# Patient Record
Sex: FEMALE | Race: WHITE | Employment: UNEMPLOYED | URBAN - METROPOLITAN AREA
[De-identification: names, ages, dates, MRNs, and addresses within clinical notes are randomized per-mention and may not be internally consistent; named-entity substitution may affect disease eponyms.]

---

## 2020-08-12 ENCOUNTER — HOSPITAL ENCOUNTER (EMERGENCY)
Age: 50
Discharge: HOME | End: 2020-08-12
Payer: MEDICAID

## 2020-08-12 ENCOUNTER — APPOINTMENT (OUTPATIENT)
Dept: GENERAL RADIOLOGY | Age: 50
End: 2020-08-12
Payer: MEDICAID

## 2020-08-12 VITALS
RESPIRATION RATE: 18 BRPM | OXYGEN SATURATION: 99 % | WEIGHT: 110 LBS | BODY MASS INDEX: 20.77 KG/M2 | HEIGHT: 61 IN | TEMPERATURE: 98.4 F | HEART RATE: 98 BPM | DIASTOLIC BLOOD PRESSURE: 75 MMHG | SYSTOLIC BLOOD PRESSURE: 140 MMHG

## 2020-08-12 PROCEDURE — 99283 EMERGENCY DEPT VISIT LOW MDM: CPT

## 2020-08-12 PROCEDURE — 73610 X-RAY EXAM OF ANKLE: CPT

## 2020-08-12 PROCEDURE — 6370000000 HC RX 637 (ALT 250 FOR IP): Performed by: PERSONAL EMERGENCY RESPONSE ATTENDANT

## 2020-08-12 PROCEDURE — 73630 X-RAY EXAM OF FOOT: CPT

## 2020-08-12 RX ORDER — NAPROXEN 500 MG/1
500 TABLET ORAL ONCE
Status: COMPLETED | OUTPATIENT
Start: 2020-08-12 | End: 2020-08-12

## 2020-08-12 RX ORDER — NAPROXEN 500 MG/1
500 TABLET ORAL 2 TIMES DAILY WITH MEALS
Qty: 30 TABLET | Refills: 0 | Status: SHIPPED | OUTPATIENT
Start: 2020-08-12 | End: 2021-01-13

## 2020-08-12 RX ADMIN — NAPROXEN 500 MG: 500 TABLET ORAL at 21:03

## 2020-08-12 ASSESSMENT — ENCOUNTER SYMPTOMS
DIARRHEA: 0
NAUSEA: 0
COLOR CHANGE: 0
SORE THROAT: 0
RHINORRHEA: 0
VOMITING: 0
COUGH: 0
SHORTNESS OF BREATH: 0
ABDOMINAL PAIN: 0
BLOOD IN STOOL: 0

## 2020-08-12 ASSESSMENT — PAIN DESCRIPTION - LOCATION: LOCATION: ANKLE

## 2020-08-12 ASSESSMENT — PAIN DESCRIPTION - ORIENTATION: ORIENTATION: LEFT

## 2020-08-12 ASSESSMENT — PAIN DESCRIPTION - PAIN TYPE: TYPE: ACUTE PAIN

## 2020-08-12 ASSESSMENT — PAIN SCALES - GENERAL
PAINLEVEL_OUTOF10: 7
PAINLEVEL_OUTOF10: 7

## 2020-08-12 ASSESSMENT — PAIN DESCRIPTION - DESCRIPTORS: DESCRIPTORS: THROBBING;SHARP

## 2020-08-13 NOTE — ED PROVIDER NOTES
[address] [facility] ED  eMERGENCY dEPARTMENT eNCOUnter      Pt Name: Pierre Moore  MRN: [id number]  [place] 1970  Date of evaluation: 8/12/2020  Provider: CANDIE Romo      HISTORY OF PRESENT ILLNESS    Pierre Moore is a 44 y.o. female with PMHx of none presents to the emergency department with left foot pain. A couple hours ago patient fell into a pothole, twisting her left ankle/foot. She complains of pain over the dorsal aspect of her left foot. No ankle pain. She did not take anything at home for pain. She initially did not have any pain but had a gradual onset. Having to limp with ambulation. HPI    Nursing Notes were reviewed. REVIEW OF SYSTEMS       Review of Systems   Constitutional: Negative for appetite change, chills and fever. HENT: Negative for congestion, rhinorrhea and sore throat. Respiratory: Negative for cough and shortness of breath. Cardiovascular: Negative for chest pain. Gastrointestinal: Negative for abdominal pain, blood in stool, diarrhea, nausea and vomiting. Genitourinary: Negative for difficulty urinating. Musculoskeletal: Positive for arthralgias and gait problem. Negative for neck stiffness. Skin: Negative for color change and rash. Neurological: Negative for dizziness, syncope, weakness, light-headedness, numbness and headaches. All other systems reviewed and are negative. PAST MEDICAL HISTORY     Past Medical History:   Diagnosis Date    Hyperlipidemia     Hypertension          SURGICAL HISTORY       Past Surgical History:   Procedure Laterality Date    HYSTERECTOMY           CURRENT MEDICATIONS       Previous Medications    No medications on file       ALLERGIES     Morphine and related and Penicillins    FAMILY HISTORY     History reviewed. No pertinent family history.        SOCIAL HISTORY       Social History     Socioeconomic History    Marital status: Single     Spouse name: None    Number of children: None    Years of education: None    Highest education level: None   Occupational History    None   Social Needs    Financial resource strain: None    Food insecurity     Worry: None     Inability: None    Transportation needs     Medical: None     Non-medical: None   Tobacco Use    Smoking status: Heavy Tobacco Smoker     Packs/day: 2.00     Years: 25.00     Pack years: 50.00     Types: Cigarettes    Smokeless tobacco: Never Used   Substance and Sexual Activity    Alcohol use: Yes    Drug use: Never    Sexual activity: Not Currently   Lifestyle    Physical activity     Days per week: None     Minutes per session: None    Stress: None   Relationships    Social connections     Talks on phone: None     Gets together: None     Attends Yarsanism service: None     Active member of club or organization: None     Attends meetings of clubs or organizations: None     Relationship status: None    Intimate partner violence     Fear of current or ex partner: None     Emotionally abused: None     Physically abused: None     Forced sexual activity: None   Other Topics Concern    None   Social History Narrative    None         PHYSICAL EXAM         ED Triage Vitals [08/12/20 2032]   BP Temp Temp Source Pulse Resp SpO2 Height Weight   (!) 140/75 98.4 °F (36.9 °C) Oral 98 18 99 % 5' 1\" (1.549 m) 110 lb (49.9 kg)       Physical Exam  Constitutional:       Appearance: She is well-developed. HENT:      Head: Normocephalic and atraumatic. Eyes:      Conjunctiva/sclera: Conjunctivae normal.      Pupils: Pupils are equal, round, and reactive to light. Neck:      Musculoskeletal: Normal range of motion and neck supple. Trachea: No tracheal deviation. Cardiovascular:      Heart sounds: Normal heart sounds. Pulmonary:      Effort: Pulmonary effort is normal. No respiratory distress. Breath sounds: Normal breath sounds. No stridor. Abdominal:      General: Bowel sounds are normal. There is no distension. Palpations: Abdomen is soft. There is no mass. Tenderness: There is no abdominal tenderness. There is no guarding or rebound. Musculoskeletal: Normal range of motion. General: Tenderness present. Comments: Patient has minimal tenderness palpation throughout dorsal aspect of left foot over 1-4 metatarsals and tarsals. Full ankle range of motion without tenderness. MSP intact distally. No fifth metatarsal tenderness, no signs of trauma, no swelling. Skin:     General: Skin is warm and dry. Capillary Refill: Capillary refill takes less than 2 seconds. Findings: No rash. Neurological:      Mental Status: She is alert and oriented to person, place, and time. Deep Tendon Reflexes: Reflexes are normal and symmetric. Psychiatric:         Behavior: Behavior normal.         Thought Content: Thought content normal.         Judgment: Judgment normal.         DIAGNOSTIC RESULTS     EKG:All EKG's are interpreted by the Emergency Department Physician who either signs or Co-signs this chart in the absence of a cardiologist.        RADIOLOGY:   Non-plain film images such as CT, Ultrasound and MRI are read by theradiologist. Plain radiographic images are visualized and preliminarily interpreted by the emergency physician with the below findings:    Interpretation per theRadiologist below, if available at the time of this note:    XR ANKLE LEFT (MIN 3 VIEWS)    (Results Pending)   XR FOOT LEFT (MIN 3 VIEWS)    (Results Pending)           LABS:  Labs Reviewed - No data to display    All other labs were within normal range or not returned as of this dictation. EMERGENCY DEPARTMENT COURSE and DIFFERENTIAL DIAGNOSIS/MDM:   Vitals:    Vitals:    08/12/20 2032   BP: (!) 140/75   Pulse: 98   Resp: 18   Temp: 98.4 °F (36.9 °C)   TempSrc: Oral   SpO2: 99%   Weight: 110 lb (49.9 kg)   Height: 5' 1\" (1.549 m)         MDM    Ankle XR shows no acute fractures.   After assessment, patient is more tender over her foot. Foot x-ray was ordered. No fractures present. Patient was given ice while in the ER and naproxen. She will be sent home with naproxen and aware to ice the areas. She does see a family doctor next week and was also given orthopedic follow-up. She does not want crutches. She will be placed in postop shoe. Standard anticipatory guidance given to patient upon discharge. Have given them a specific time frame in which to follow-up and who to follow-up with. I have also advised them that they should return to the emergency department if they get worse, or not getting better or develop any new or concerning symptoms. Patient demonstrates understanding. CRITICAL CARE TIME   Total Critical Caretime was 0 minutes, excluding separately reportable procedures. There was a high probability of clinically significant/life threatening deterioration in the patient's condition which required my urgent intervention. Procedures    FINAL IMPRESSION      1. Sprain of left foot, initial encounter          DISPOSITION/PLAN   DISPOSITION Decision To Discharge 08/12/2020 09:23:52 PM      PATIENT REFERRED TO:  [facility]  [address] [address]  [address] [address]  [phone]  In 1 week  As needed      DISCHARGE MEDICATIONS:  New Prescriptions    NAPROXEN (NAPROSYN) 500 MG TABLET    Take 1 tablet by mouth 2 times daily (with meals)          (Please notethat portions of this note were completed with a voice recognition program.  Efforts were made to edit the dictations but occasionally words are mis-transcribed. )    CANDIE Ragland (electronically signed)  Emergency Physician Assistant         Henrry Sánchez [place]  08/12/20 9393

## 2020-08-13 NOTE — ED TRIAGE NOTES
Patient was walking in the yard and tripped in a hole. She didn't feel any popping or tearing but she cant walk on her left ankle. She is alert and orientated x 4. Pink, warm and dry. VSS. Will continue to monitor.

## 2021-01-13 ENCOUNTER — APPOINTMENT (OUTPATIENT)
Dept: CT IMAGING | Age: 51
End: 2021-01-13
Payer: MEDICAID

## 2021-01-13 ENCOUNTER — HOSPITAL ENCOUNTER (EMERGENCY)
Age: 51
Discharge: HOME | End: 2021-01-13
Payer: MEDICAID

## 2021-01-13 VITALS
HEART RATE: 74 BPM | OXYGEN SATURATION: 98 % | HEIGHT: 61 IN | WEIGHT: 109 LBS | TEMPERATURE: 97.9 F | BODY MASS INDEX: 20.58 KG/M2 | SYSTOLIC BLOOD PRESSURE: 105 MMHG | RESPIRATION RATE: 16 BRPM | DIASTOLIC BLOOD PRESSURE: 66 MMHG

## 2021-01-13 DIAGNOSIS — N10 ACUTE PYELONEPHRITIS: Primary | ICD-10-CM

## 2021-01-13 LAB
ALBUMIN SERPL-MCNC: 4 G/DL (ref 3.5–4.6)
ALP BLD-CCNC: 78 U/L (ref 40–130)
ALT SERPL-CCNC: 7 U/L (ref 0–33)
ANION GAP SERPL CALCULATED.3IONS-SCNC: 15 MEQ/L (ref 9–15)
AST SERPL-CCNC: 11 U/L (ref 0–35)
BACTERIA: ABNORMAL /HPF
BASOPHILS ABSOLUTE: 0 K/UL (ref 0–0.2)
BASOPHILS RELATIVE PERCENT: 0.3 %
BILIRUB SERPL-MCNC: 0.5 MG/DL (ref 0.2–0.7)
BILIRUBIN URINE: NEGATIVE
BLOOD, URINE: ABNORMAL
BUN BLDV-MCNC: 14 MG/DL (ref 6–20)
CALCIUM SERPL-MCNC: 9.6 MG/DL (ref 8.5–9.9)
CHLORIDE BLD-SCNC: 96 MEQ/L (ref 95–107)
CLARITY: ABNORMAL
CO2: 23 MEQ/L (ref 20–31)
COLOR: ABNORMAL
CREAT SERPL-MCNC: 0.87 MG/DL (ref 0.5–0.9)
EOSINOPHILS ABSOLUTE: 0 K/UL (ref 0–0.7)
EOSINOPHILS RELATIVE PERCENT: 0 %
EPITHELIAL CELLS, UA: ABNORMAL /HPF (ref 0–5)
GFR AFRICAN AMERICAN: >60
GFR NON-AFRICAN AMERICAN: >60
GLOBULIN: 3.9 G/DL (ref 2.3–3.5)
GLUCOSE BLD-MCNC: 121 MG/DL (ref 70–99)
GLUCOSE URINE: NEGATIVE MG/DL
HCT VFR BLD CALC: 38 % (ref 37–47)
HEMOGLOBIN: 12.7 G/DL (ref 12–16)
HYALINE CASTS: ABNORMAL /HPF (ref 0–5)
KETONES, URINE: ABNORMAL MG/DL
LEUKOCYTE ESTERASE, URINE: ABNORMAL
LYMPHOCYTES ABSOLUTE: 0.6 K/UL (ref 1–4.8)
LYMPHOCYTES RELATIVE PERCENT: 4.4 %
MAGNESIUM: 1.5 MG/DL (ref 1.7–2.4)
MCH RBC QN AUTO: 31 PG (ref 27–31.3)
MCHC RBC AUTO-ENTMCNC: 33.4 % (ref 33–37)
MCV RBC AUTO: 92.8 FL (ref 82–100)
MONOCYTES ABSOLUTE: 1.1 K/UL (ref 0.2–0.8)
MONOCYTES RELATIVE PERCENT: 8.4 %
NEUTROPHILS ABSOLUTE: 11.2 K/UL (ref 1.4–6.5)
NEUTROPHILS RELATIVE PERCENT: 86.9 %
NITRITE, URINE: POSITIVE
PDW BLD-RTO: 16.4 % (ref 11.5–14.5)
PH UA: 5 (ref 5–9)
PLATELET # BLD: 140 K/UL (ref 130–400)
POTASSIUM SERPL-SCNC: 4.1 MEQ/L (ref 3.4–4.9)
PROTEIN UA: >=300 MG/DL
RBC # BLD: 4.09 M/UL (ref 4.2–5.4)
RBC UA: ABNORMAL /HPF (ref 0–5)
SODIUM BLD-SCNC: 134 MEQ/L (ref 135–144)
SPECIFIC GRAVITY UA: 1.02 (ref 1–1.03)
TOTAL PROTEIN: 7.9 G/DL (ref 6.3–8)
URINE REFLEX TO CULTURE: YES
UROBILINOGEN, URINE: 0.2 E.U./DL
WBC # BLD: 12.9 K/UL (ref 4.8–10.8)
WBC UA: >100 /HPF (ref 0–5)

## 2021-01-13 PROCEDURE — 74150 CT ABDOMEN W/O CONTRAST: CPT

## 2021-01-13 PROCEDURE — 96374 THER/PROPH/DIAG INJ IV PUSH: CPT

## 2021-01-13 PROCEDURE — 36415 COLL VENOUS BLD VENIPUNCTURE: CPT

## 2021-01-13 PROCEDURE — 99283 EMERGENCY DEPT VISIT LOW MDM: CPT

## 2021-01-13 PROCEDURE — 87077 CULTURE AEROBIC IDENTIFY: CPT

## 2021-01-13 PROCEDURE — 6360000002 HC RX W HCPCS: Performed by: PHYSICIAN ASSISTANT

## 2021-01-13 PROCEDURE — 85025 COMPLETE CBC W/AUTO DIFF WBC: CPT

## 2021-01-13 PROCEDURE — 6370000000 HC RX 637 (ALT 250 FOR IP): Performed by: PHYSICIAN ASSISTANT

## 2021-01-13 PROCEDURE — 87186 SC STD MICRODIL/AGAR DIL: CPT

## 2021-01-13 PROCEDURE — 83735 ASSAY OF MAGNESIUM: CPT

## 2021-01-13 PROCEDURE — 96375 TX/PRO/DX INJ NEW DRUG ADDON: CPT

## 2021-01-13 PROCEDURE — 2580000003 HC RX 258: Performed by: PHYSICIAN ASSISTANT

## 2021-01-13 PROCEDURE — 81001 URINALYSIS AUTO W/SCOPE: CPT

## 2021-01-13 PROCEDURE — 87086 URINE CULTURE/COLONY COUNT: CPT

## 2021-01-13 PROCEDURE — 80053 COMPREHEN METABOLIC PANEL: CPT

## 2021-01-13 RX ORDER — ONDANSETRON 4 MG/1
4 TABLET, ORALLY DISINTEGRATING ORAL EVERY 8 HOURS PRN
Qty: 20 TABLET | Refills: 0 | Status: SHIPPED | OUTPATIENT
Start: 2021-01-13

## 2021-01-13 RX ORDER — NAPROXEN 500 MG/1
500 TABLET ORAL 2 TIMES DAILY
Qty: 60 TABLET | Refills: 0 | Status: SHIPPED | OUTPATIENT
Start: 2021-01-13

## 2021-01-13 RX ORDER — PHENAZOPYRIDINE HYDROCHLORIDE 100 MG/1
200 TABLET, FILM COATED ORAL ONCE
Status: COMPLETED | OUTPATIENT
Start: 2021-01-13 | End: 2021-01-13

## 2021-01-13 RX ORDER — FENTANYL CITRATE 50 UG/ML
25 INJECTION, SOLUTION INTRAMUSCULAR; INTRAVENOUS ONCE
Status: COMPLETED | OUTPATIENT
Start: 2021-01-13 | End: 2021-01-13

## 2021-01-13 RX ORDER — TRAMADOL HYDROCHLORIDE 50 MG/1
50 TABLET ORAL EVERY 6 HOURS PRN
Qty: 12 TABLET | Refills: 0 | Status: SHIPPED | OUTPATIENT
Start: 2021-01-13 | End: 2021-01-16

## 2021-01-13 RX ORDER — ONDANSETRON 2 MG/ML
4 INJECTION INTRAMUSCULAR; INTRAVENOUS ONCE
Status: COMPLETED | OUTPATIENT
Start: 2021-01-13 | End: 2021-01-13

## 2021-01-13 RX ORDER — CEPHALEXIN 500 MG/1
500 CAPSULE ORAL 4 TIMES DAILY
Qty: 40 CAPSULE | Refills: 0 | Status: SHIPPED | OUTPATIENT
Start: 2021-01-13 | End: 2021-11-01

## 2021-01-13 RX ORDER — 0.9 % SODIUM CHLORIDE 0.9 %
1000 INTRAVENOUS SOLUTION INTRAVENOUS ONCE
Status: COMPLETED | OUTPATIENT
Start: 2021-01-13 | End: 2021-01-13

## 2021-01-13 RX ORDER — NAPROXEN 500 MG/1
500 TABLET ORAL ONCE
Status: COMPLETED | OUTPATIENT
Start: 2021-01-13 | End: 2021-01-13

## 2021-01-13 RX ADMIN — FENTANYL CITRATE 25 MCG: 50 INJECTION, SOLUTION INTRAMUSCULAR; INTRAVENOUS at 17:14

## 2021-01-13 RX ADMIN — CEFTRIAXONE SODIUM 1 G: 1 INJECTION, POWDER, FOR SOLUTION INTRAMUSCULAR; INTRAVENOUS at 18:12

## 2021-01-13 RX ADMIN — PHENAZOPYRIDINE HYDROCHLORIDE 200 MG: 100 TABLET ORAL at 17:14

## 2021-01-13 RX ADMIN — SODIUM CHLORIDE 1000 ML: 9 INJECTION, SOLUTION INTRAVENOUS at 17:14

## 2021-01-13 RX ADMIN — NAPROXEN 500 MG: 500 TABLET ORAL at 18:12

## 2021-01-13 RX ADMIN — ONDANSETRON 4 MG: 2 INJECTION INTRAMUSCULAR; INTRAVENOUS at 17:14

## 2021-01-13 ASSESSMENT — ENCOUNTER SYMPTOMS
TROUBLE SWALLOWING: 0
SHORTNESS OF BREATH: 0
NAUSEA: 1
DIARRHEA: 0
ABDOMINAL PAIN: 0
EYE PAIN: 0
APNEA: 0
ALLERGIC/IMMUNOLOGIC NEGATIVE: 1
COLOR CHANGE: 0

## 2021-01-13 ASSESSMENT — PAIN SCALES - GENERAL
PAINLEVEL_OUTOF10: 9
PAINLEVEL_OUTOF10: 6
PAINLEVEL_OUTOF10: 7
PAINLEVEL_OUTOF10: 6
PAINLEVEL_OUTOF10: 9

## 2021-01-13 ASSESSMENT — PAIN DESCRIPTION - ORIENTATION: ORIENTATION: RIGHT

## 2021-01-13 NOTE — ED NOTES
Patient resting in bed with no s/s of distress. Rating pain 7/10; requesting naproxen  Matt SCHREIBER aware.         Porfirio Buckley RN  01/13/21 2080

## 2021-01-13 NOTE — ED PROVIDER NOTES
[address] [facility] ED  eMERGENCYdEPARTMENT eNCOUnter      Pt Name: Ray Epstein  MRN: [id number]  [place] 1970  Date of evaluation: 1/13/2021  Provider:Marc Hooper PA-C    CHIEF COMPLAINT       Chief Complaint   Patient presents with    Urinary Tract Infection     pt c/o burningf with urination and frequent urination  states that she only has one kidney and it hurts in her kidney area          HISTORY OF PRESENT ILLNESS  (Location/Symptom, Timing/Onset, Context/Setting, Quality, Duration, Modifying Factors, Severity.)   Ray Epstein is a 44 y.o. female who presents to the emergency department with complaints of urinary urgency, frequency, dysuria, abdominal pain and flank pain with associated nausea. Patient states that the symptoms been ongoing for the past 10 days and have aggressively been worsening in severity. Patient states nausea but denies any vomiting. Patient complains of some chills but has not taken her temperature. Patient denies any diarrhea or change in bowel habits. Patient does state her urine has been darker in color and believes there may be some blood in it. Patient has been trying drinking over-the-counter cranberry juice only for her symptoms without improvement    HPI    Nursing Notes were reviewed and I agree. REVIEW OF SYSTEMS    (2-9 systems for level 4, 10 or more for level 5)     Review of Systems   Constitutional: Positive for chills. Negative for diaphoresis and fever. HENT: Negative for hearing loss and trouble swallowing. Eyes: Negative for pain. Respiratory: Negative for apnea and shortness of breath. Cardiovascular: Negative for chest pain. Gastrointestinal: Positive for nausea. Negative for abdominal pain and diarrhea. Endocrine: Negative. Genitourinary: Positive for dysuria, flank pain, frequency and urgency. Negative for hematuria. Musculoskeletal: Negative for neck pain and neck stiffness. Skin: Negative for color change. Allergic/Immunologic: Negative. Neurological: Negative for dizziness and numbness. Hematological: Negative. Psychiatric/Behavioral: Negative. All other systems reviewed and are negative. Except as noted above the remainder of the review of systems was reviewed and negative. PAST MEDICAL HISTORY     Past Medical History:   Diagnosis Date    Hyperlipidemia     Hypertension          SURGICAL HISTORY       Past Surgical History:   Procedure Laterality Date    HYSTERECTOMY           CURRENT MEDICATIONS       Previous Medications    No medications on file       ALLERGIES     Morphine and related and Penicillins    FAMILY HISTORY     No family history on file. SOCIAL HISTORY       Social History     Socioeconomic History    Marital status: Single     Spouse name: Not on file    Number of children: Not on file    Years of education: Not on file    Highest education level: Not on file   Occupational History    Not on file   Social Needs    Financial resource strain: Not on file    Food insecurity     Worry: Not on file     Inability: Not on file    Transportation needs     Medical: Not on file     Non-medical: Not on file   Tobacco Use    Smoking status: Heavy Tobacco Smoker     Packs/day: 2.00     Years: 25.00     Pack years: 50.00     Types: Cigarettes    Smokeless tobacco: Never Used   Substance and Sexual Activity    Alcohol use:  Yes    Drug use: Never    Sexual activity: Not Currently   Lifestyle    Physical activity     Days per week: Not on file     Minutes per session: Not on file    Stress: Not on file   Relationships    Social connections     Talks on phone: Not on file     Gets together: Not on file     Attends Zoroastrianism service: Not on file     Active member of club or organization: Not on file     Attends meetings of clubs or organizations: Not on file     Relationship status: Not on file    Intimate partner violence Fear of current or ex partner: Not on file     Emotionally abused: Not on file     Physically abused: Not on file     Forced sexual activity: Not on file   Other Topics Concern    Not on file   Social History Narrative    Not on file       SCREENINGS           PHYSICAL EXAM    (up to 7 forlevel 4, 8 or more for level 5)     ED Triage Vitals [01/13/21 1622]   BP Temp Temp src Pulse Resp SpO2 Height Weight   130/86 97.9 °F (36.6 °C) -- 75 16 99 % 5' 1\" (1.549 m) 109 lb (49.4 kg)       Physical Exam  Vitals signs and nursing note reviewed. Constitutional:       General: She is not in acute distress. Appearance: She is well-developed. She is not diaphoretic. HENT:      Head: Normocephalic and atraumatic. Mouth/Throat:      Pharynx: No oropharyngeal exudate. Eyes:      General: No scleral icterus. Conjunctiva/sclera: Conjunctivae normal.      Pupils: Pupils are equal, round, and reactive to light. Neck:      Musculoskeletal: Normal range of motion and neck supple. Trachea: No tracheal deviation. Cardiovascular:      Rate and Rhythm: Normal rate. Heart sounds: Normal heart sounds. Pulmonary:      Effort: Pulmonary effort is normal. No respiratory distress. Breath sounds: Normal breath sounds. Abdominal:      General: Bowel sounds are normal. There is no distension. Palpations: Abdomen is soft. Tenderness: There is abdominal tenderness in the right upper quadrant and right lower quadrant. There is right CVA tenderness. There is no left CVA tenderness. Musculoskeletal: Normal range of motion. Skin:     General: Skin is warm and dry. Findings: No erythema or rash. Neurological:      Mental Status: She is alert and oriented to person, place, and time. Cranial Nerves: No cranial nerve deficit. Motor: No abnormal muscle tone. Psychiatric:         Behavior: Behavior normal.         Thought Content:  Thought content normal. Judgment: Judgment normal.           DIAGNOSTIC RESULTS     RADIOLOGY:   Non-plain film images such as CT, Ultrasound and MRI are read by the radiologist. Edinayden Reynaga radiographic images are visualized and preliminarilyinterpreted by Olga Diaz PA-C with the below findings:    Pyelonephritis    Interpretation per the Radiologist below, if available at the time of this note:    CT KIDNEY WO CONTRAST    (Results Pending)       LABS:  Labs Reviewed   COMPREHENSIVE METABOLIC PANEL - Abnormal; Notable for the following components:       Result Value    Sodium 134 (*)     Glucose 121 (*)     Globulin 3.9 (*)     All other components within normal limits   CBC WITH AUTO DIFFERENTIAL - Abnormal; Notable for the following components:    WBC 12.9 (*)     RBC 4.09 (*)     RDW 16.4 (*)     Neutrophils Absolute 11.2 (*)     Lymphocytes Absolute 0.6 (*)     Monocytes Absolute 1.1 (*)     All other components within normal limits   URINE RT REFLEX TO CULTURE - Abnormal; Notable for the following components:    Color, UA DARK YELLOW (*)     Clarity, UA TURBID (*)     Ketones, Urine TRACE (*)     Blood, Urine MODERATE (*)     Protein, UA >=300 (*)     Nitrite, Urine POSITIVE (*)     Leukocyte Esterase, Urine MODERATE (*)     All other components within normal limits   MAGNESIUM - Abnormal; Notable for the following components:    Magnesium 1.5 (*)     All other components within normal limits   MICROSCOPIC URINALYSIS       All other labs were within normal range or not returnedas of this dictation.     EMERGENCYDEPARTMENT COURSE and DIFFERENTIAL DIAGNOSIS/MDM:   Vitals:    Vitals:    01/13/21 1622   BP: 130/86   Pulse: 75   Resp: 16   Temp: 97.9 °F (36.6 °C)   SpO2: 99%   Weight: 109 lb (49.4 kg)   Height: 5' 1\" (1.549 m)       REASSESSMENT Presents emergency department with a 10-day history of UTI symptoms as well as right flank pain. Patient has had chills but no documented fever. Laboratory testing does reveal a slightly elevated white count at 12.9. Patient's kidney function is unremarkable. CT scan does reveal possible pyelonephritis but no mobile stones. Patient was given IV antibiotic in the emergency department and will be discharged home with supportive therapy including oral antibiotics, antiemetics, pain control and close PCP follow-up. Patient was advised to return to the emergency department for worsening pain, vomiting or high fevers    MDM    PROCEDURES:    Procedures      FINAL IMPRESSION      1. Acute pyelonephritis          DISPOSITION/PLAN   DISPOSITION Discharge - Pending Orders Complete 01/13/2021 06:18:49 PM      PATIENT REFERRED TO:  Nelsy Marti MD  [address]  [phone]    Call in 1 day        DISCHARGE MEDICATIONS:  New Prescriptions    CEPHALEXIN (KEFLEX) 500 MG CAPSULE    Take 1 capsule by mouth 4 times daily    NAPROXEN (NAPROSYN) 500 MG TABLET    Take 1 tablet by mouth 2 times daily    ONDANSETRON (ZOFRAN ODT) 4 MG DISINTEGRATING TABLET    Take 1 tablet by mouth every 8 hours as needed for Nausea    TRAMADOL (ULTRAM) 50 MG TABLET    Take 1 tablet by mouth every 6 hours as needed for Pain for up to 3 days. Intended supply: 3 days.  Take lowest dose possible to manage pain       (Please note that portions of this note were completed with a voice recognition program.  Efforts were made to edit the dictations but occasionally words are mis-transcribed.)    CHELLE Hunt PA-C  01/13/21 4938

## 2021-01-13 NOTE — ED TRIAGE NOTES
Patient came to the ED for urinary frequency, dysuria [id number]. Patient states urine is dark in color and malodorous.

## 2021-01-16 LAB
ORGANISM: ABNORMAL
URINE CULTURE, ROUTINE: ABNORMAL

## 2021-01-19 ENCOUNTER — APPOINTMENT (OUTPATIENT)
Dept: CT IMAGING | Age: 51
End: 2021-01-19
Payer: MEDICAID

## 2021-01-19 ENCOUNTER — HOSPITAL ENCOUNTER (EMERGENCY)
Age: 51
Discharge: HOME | End: 2021-01-19
Payer: MEDICAID

## 2021-01-19 VITALS
RESPIRATION RATE: 20 BRPM | DIASTOLIC BLOOD PRESSURE: 72 MMHG | SYSTOLIC BLOOD PRESSURE: 126 MMHG | WEIGHT: 109 LBS | TEMPERATURE: 98.4 F | HEART RATE: 76 BPM | BODY MASS INDEX: 20.58 KG/M2 | HEIGHT: 61 IN | OXYGEN SATURATION: 97 %

## 2021-01-19 DIAGNOSIS — R11.0 NAUSEA: ICD-10-CM

## 2021-01-19 DIAGNOSIS — R10.9 ABDOMINAL PAIN, UNSPECIFIED ABDOMINAL LOCATION: Primary | ICD-10-CM

## 2021-01-19 LAB
ALBUMIN SERPL-MCNC: 4 G/DL (ref 3.5–4.6)
ALP BLD-CCNC: 133 U/L (ref 40–130)
ALT SERPL-CCNC: 13 U/L (ref 0–33)
ANION GAP SERPL CALCULATED.3IONS-SCNC: 13 MEQ/L (ref 9–15)
AST SERPL-CCNC: 16 U/L (ref 0–35)
ATYPICAL LYMPHOCYTE RELATIVE PERCENT: 2 %
BACTERIA: NEGATIVE /HPF
BASOPHILS ABSOLUTE: 0 K/UL (ref 0–0.2)
BASOPHILS RELATIVE PERCENT: 0.9 %
BILIRUB SERPL-MCNC: <0.2 MG/DL (ref 0.2–0.7)
BILIRUBIN URINE: NEGATIVE
BLOOD, URINE: NEGATIVE
BUN BLDV-MCNC: 10 MG/DL (ref 6–20)
CALCIUM SERPL-MCNC: 10 MG/DL (ref 8.5–9.9)
CHLORIDE BLD-SCNC: 106 MEQ/L (ref 95–107)
CLARITY: CLEAR
CO2: 24 MEQ/L (ref 20–31)
COLOR: ABNORMAL
CREAT SERPL-MCNC: 0.75 MG/DL (ref 0.5–0.9)
EOSINOPHILS ABSOLUTE: 0.2 K/UL (ref 0–0.7)
EOSINOPHILS RELATIVE PERCENT: 2 %
EPITHELIAL CELLS, UA: NORMAL /HPF (ref 0–5)
GFR AFRICAN AMERICAN: >60
GFR NON-AFRICAN AMERICAN: >60
GLOBULIN: 4.2 G/DL (ref 2.3–3.5)
GLUCOSE BLD-MCNC: 90 MG/DL (ref 70–99)
GLUCOSE URINE: NEGATIVE MG/DL
HCT VFR BLD CALC: 35.7 % (ref 37–47)
HEMOGLOBIN: 11.9 G/DL (ref 12–16)
HYALINE CASTS: NORMAL /HPF (ref 0–5)
KETONES, URINE: NEGATIVE MG/DL
LACTIC ACID: 1 MMOL/L (ref 0.5–2.2)
LEUKOCYTE ESTERASE, URINE: NEGATIVE
LIPASE: 61 U/L (ref 12–95)
LYMPHOCYTES ABSOLUTE: 1.5 K/UL (ref 1–4.8)
LYMPHOCYTES RELATIVE PERCENT: 16 %
MAGNESIUM: 1.8 MG/DL (ref 1.7–2.4)
MCH RBC QN AUTO: 31.4 PG (ref 27–31.3)
MCHC RBC AUTO-ENTMCNC: 33.2 % (ref 33–37)
MCV RBC AUTO: 94.5 FL (ref 82–100)
MONOCYTES ABSOLUTE: 0.3 K/UL (ref 0.2–0.8)
MONOCYTES RELATIVE PERCENT: 3.9 %
NEUTROPHILS ABSOLUTE: 6.2 K/UL (ref 1.4–6.5)
NEUTROPHILS RELATIVE PERCENT: 73 %
NITRITE, URINE: POSITIVE
PDW BLD-RTO: 16.5 % (ref 11.5–14.5)
PH UA: 5.5 (ref 5–9)
PLATELET # BLD: 327 K/UL (ref 130–400)
PLATELET SLIDE REVIEW: NORMAL
POC CREATININE WHOLE BLOOD: 0.9
POTASSIUM SERPL-SCNC: 4.3 MEQ/L (ref 3.4–4.9)
PROCALCITONIN: 0.15 NG/ML (ref 0–0.15)
PROMYELOCYTES PERCENT: 3 %
PROTEIN UA: NEGATIVE MG/DL
RBC # BLD: 3.78 M/UL (ref 4.2–5.4)
RBC # BLD: NORMAL 10*6/UL
RBC UA: NORMAL /HPF (ref 0–5)
SMUDGE CELLS: 1.9
SODIUM BLD-SCNC: 143 MEQ/L (ref 135–144)
SPECIFIC GRAVITY UA: 1 (ref 1–1.03)
TOTAL PROTEIN: 8.2 G/DL (ref 6.3–8)
URINE REFLEX TO CULTURE: ABNORMAL
UROBILINOGEN, URINE: 1 E.U./DL
WBC # BLD: 8.1 K/UL (ref 4.8–10.8)
WBC UA: NORMAL /HPF (ref 0–5)

## 2021-01-19 PROCEDURE — 83605 ASSAY OF LACTIC ACID: CPT

## 2021-01-19 PROCEDURE — 99283 EMERGENCY DEPT VISIT LOW MDM: CPT

## 2021-01-19 PROCEDURE — 74177 CT ABD & PELVIS W/CONTRAST: CPT

## 2021-01-19 PROCEDURE — 87040 BLOOD CULTURE FOR BACTERIA: CPT

## 2021-01-19 PROCEDURE — 83735 ASSAY OF MAGNESIUM: CPT

## 2021-01-19 PROCEDURE — 80053 COMPREHEN METABOLIC PANEL: CPT

## 2021-01-19 PROCEDURE — 81001 URINALYSIS AUTO W/SCOPE: CPT

## 2021-01-19 PROCEDURE — 6360000004 HC RX CONTRAST MEDICATION: Performed by: STUDENT IN AN ORGANIZED HEALTH CARE EDUCATION/TRAINING PROGRAM

## 2021-01-19 PROCEDURE — 83690 ASSAY OF LIPASE: CPT

## 2021-01-19 PROCEDURE — 36415 COLL VENOUS BLD VENIPUNCTURE: CPT

## 2021-01-19 PROCEDURE — 99284 EMERGENCY DEPT VISIT MOD MDM: CPT

## 2021-01-19 PROCEDURE — 84145 PROCALCITONIN (PCT): CPT

## 2021-01-19 PROCEDURE — 2580000003 HC RX 258: Performed by: STUDENT IN AN ORGANIZED HEALTH CARE EDUCATION/TRAINING PROGRAM

## 2021-01-19 PROCEDURE — 85025 COMPLETE CBC W/AUTO DIFF WBC: CPT

## 2021-01-19 RX ORDER — ACETAMINOPHEN 500 MG
1000 TABLET ORAL EVERY 6 HOURS PRN
Qty: 80 TABLET | Refills: 0 | Status: SHIPPED | OUTPATIENT
Start: 2021-01-19 | End: 2021-01-29

## 2021-01-19 RX ORDER — 0.9 % SODIUM CHLORIDE 0.9 %
1000 INTRAVENOUS SOLUTION INTRAVENOUS ONCE
Status: COMPLETED | OUTPATIENT
Start: 2021-01-19 | End: 2021-01-19

## 2021-01-19 RX ADMIN — IOPAMIDOL 100 ML: 612 INJECTION, SOLUTION INTRAVENOUS at 20:16

## 2021-01-19 RX ADMIN — SODIUM CHLORIDE 1000 ML: 9 INJECTION, SOLUTION INTRAVENOUS at 19:40

## 2021-01-19 ASSESSMENT — PAIN SCALES - GENERAL: PAINLEVEL_OUTOF10: 4

## 2021-01-19 ASSESSMENT — PAIN DESCRIPTION - DESCRIPTORS: DESCRIPTORS: PRESSURE

## 2021-01-19 ASSESSMENT — PAIN DESCRIPTION - PAIN TYPE: TYPE: ACUTE PAIN

## 2021-01-19 NOTE — ED TRIAGE NOTES
Pt arrived with complaint of urinary issues. Pt reports the pain is suprapubic are. Pt describes the pain as pressure. Pt reports the pain radiates to lower back. Pt reports the pain is intermittent Pt reports the pain started 3-4 days ago. Pt took Azo. Pt reports last void was today. Pt rates the pain 6/10. On a pain scale 1-10, 10 being the highest. Pt reports moving increases the pain. Pt **denies/complaints of dysuria, frequency, urgency. Pt denies shortness of breath, weakness, fatigue, fever, cold sweats, dizziness, vomiting or nausea. Pt reports a history of kidney removal. Pt is A & O x 4.

## 2021-01-20 NOTE — ED NOTES
Pt awake and alert. No s/s of distress noted at this time. Pt denies needs at this time. Will continue to monitor.         Shashi Uribe RN  01/19/21 1943

## 2021-01-20 NOTE — ED NOTES
Pt awake and alert. No s/s of distress noted at this time. Pt denies needs at this time. Will continue to monitor.         Clayton Gaines RN  01/19/21 9194

## 2021-01-20 NOTE — ED PROVIDER NOTES
[facility] ED  EMERGENCY DEPARTMENT ENCOUNTER      Pt Name: Afshin Stanley  MRN: [id number]  [place] 1970  Date of evaluation: 1/19/2021  Provider: Lizandro Lester PA-C    CHIEF COMPLAINT       Chief Complaint   Patient presents with    Urinary Tract Infection     was dx last wed and not getting any better          HISTORY OF PRESENT ILLNESS   (Location/Symptom, Timing/Onset, Context/Setting, Quality, Duration, Modifying Factors, Severity)  Note limiting factors. Afshin Stanley is a 44 y.o. female who per chart review has pmhx of L heminephrectomy, HTN, hyperlipidemia presents to the emergency department with gradual onset, intermittent, moderate suprapubic pain described as pressure. She states she also has some right sided low back pain that radiates to the RLQ. Pt states she was here on 1/13 and dx with pyelo. She was given Keflex Tramadol and zofran but states the pharmacy changed the antibiotic because she had an allergy to PCN. I called her pharmacy and they state they changed her to [address] Pt has been compliant with the antibiotic and has a few doses left, last will be Saturday, 1/23. She is also taking Azo. Pain is worse with movement. She states urinary sx of frequency, urgency, and dysuria are improved but the suprapubic pain is not. She had a virtual visit with her pcp yesterday. Pt does not have a nephrologist, has not seen one for many years after moving to [place]. She denies fever, chills, nausea, vomiting, diarrhea, abd distention, blood in the stool or urine, cp, sob, bowel or bladder incontinence, saddle anesthesia, abd distention, difficulty passing gas. HPI    Nursing Notes were reviewed. REVIEW OF SYSTEMS    (2-9 systems for level 4, 10 or more for level 5)     Review of Systems   Constitutional: Negative for chills and fever. HENT: Negative for congestion. Eyes: Negative for photophobia. Respiratory: Negative for cough, shortness of breath and wheezing. Cardiovascular: Negative for chest pain and palpitations. Gastrointestinal: Positive for abdominal pain. Negative for abdominal distention, blood in stool, constipation, diarrhea, nausea and vomiting. Genitourinary: Negative for decreased urine volume, dysuria, flank pain, frequency, genital sores, hematuria, pelvic pain and urgency. Musculoskeletal: Positive for back pain. Negative for myalgias. Allergic/Immunologic: Negative for immunocompromised state. Neurological: Negative for dizziness, weakness and headaches. All other systems reviewed and are negative. Except as noted above the remainder of the review of systems was reviewed and negative. PAST MEDICAL HISTORY     Past Medical History:   Diagnosis Date    Hyperlipidemia     Hypertension          SURGICAL HISTORY       Past Surgical History:   Procedure Laterality Date    HYSTERECTOMY           CURRENT MEDICATIONS       Discharge Medication List as of 1/19/2021  9:56 PM      CONTINUE these medications which have NOT CHANGED    Details   cephALEXin (KEFLEX) 500 MG capsule Take 1 capsule by mouth 4 times daily, Disp-40 capsule, R-0Normal      ondansetron (ZOFRAN ODT) 4 MG disintegrating tablet Take 1 tablet by mouth every 8 hours as needed for Nausea, Disp-20 tablet, R-0Normal      naproxen (NAPROSYN) 500 MG tablet Take 1 tablet by mouth 2 times daily, Disp-60 tablet, R-0Normal             ALLERGIES     Morphine and related and Penicillins    FAMILY HISTORY     History reviewed. No pertinent family history.        SOCIAL HISTORY       Social History     Socioeconomic History    Marital status: Single     Spouse name: None    Number of children: None    Years of education: None    Highest education level: None   Occupational History    None   Social Needs    Financial resource strain: None    Food insecurity     Worry: None     Inability: None  Transportation needs     Medical: None     Non-medical: None   Tobacco Use    Smoking status: Heavy Tobacco Smoker     Packs/day: 0.50     Years: 25.00     Pack years: 12.50     Types: Cigarettes    Smokeless tobacco: Never Used   Substance and Sexual Activity    Alcohol use: Yes     Comment: 3 glasses wine qd    Drug use: Never    Sexual activity: None   Lifestyle    Physical activity     Days per week: None     Minutes per session: None    Stress: None   Relationships    Social connections     Talks on phone: None     Gets together: None     Attends Zoroastrianism service: None     Active member of club or organization: None     Attends meetings of clubs or organizations: None     Relationship status: None    Intimate partner violence     Fear of current or ex partner: None     Emotionally abused: None     Physically abused: None     Forced sexual activity: None   Other Topics Concern    None   Social History Narrative    None       SCREENINGS        Kash Coma Scale  Eye Opening: Spontaneous  Best Verbal Response: Oriented  Best Motor Response: Obeys commands  Kash Coma Scale Score: 15               PHYSICAL EXAM    (up to 7 for level 4, 8 or more for level 5)     ED Triage Vitals [01/19/21 1825]   BP Temp Temp Source Pulse Resp SpO2 Height Weight   (!) 154/84 98.4 °F (36.9 °C) Temporal 99 18 97 % 5' 1\" (1.549 m) 109 lb (49.4 kg)       Physical Exam  Constitutional:       General: She is not in acute distress. Appearance: She is well-developed. She is not toxic-appearing. HENT:      Head: Normocephalic and atraumatic. Nose: Nose normal.      Mouth/Throat:      Mouth: Mucous membranes are moist.   Eyes:      Pupils: Pupils are equal, round, and reactive to light. Neck:      Musculoskeletal: Normal range of motion. Cardiovascular:      Rate and Rhythm: Normal rate and regular rhythm. Heart sounds: No murmur. No friction rub. No gallop.     Pulmonary: Effort: Pulmonary effort is normal.      Breath sounds: Normal breath sounds. No wheezing, rhonchi or rales. Abdominal:      General: Bowel sounds are normal. There is no distension. Palpations: Abdomen is soft. There is no mass. Tenderness: There is abdominal tenderness (RLQ). There is right CVA tenderness. There is no left CVA tenderness, guarding or rebound. Hernia: No hernia is present. Musculoskeletal:         General: No swelling. Right lower leg: No edema. Left lower leg: No edema. Skin:     General: Skin is warm and dry. Capillary Refill: Capillary refill takes less than 2 seconds. Findings: No rash. Neurological:      General: No focal deficit present. Mental Status: She is alert and oriented to person, place, and time. DIAGNOSTIC RESULTS     EKG: All EKG's are interpreted by the Emergency Department Physician who either signs or Co-signs this chart in the absence of a cardiologist.        RADIOLOGY:   Non-plain film images such as CT, Ultrasound and MRI are read by the radiologist. Plain radiographic images are visualized and preliminarily interpreted by the emergency physician with the below findings:      Interpretation per the Radiologist below, if available at the time of this note:    CT ABDOMEN PELVIS W IV CONTRAST Additional Contrast? None   Final Result   1. THERE ARE AREAS OF SEGMENTAL LOW-ATTENUATION WITHIN THE MID TO LOWER POLE OF THE RIGHT KIDNEY. THE RIGHT KIDNEY SHOWS SOME SURROUNDING PERINEPHRIC STRANDING. FINDINGS MAY REFLECT THAT OF PYELONEPHRITIS. 2. THE PUNCTATE CALCULI SEEN ON THE NONCONTRAST STUDY OF THE PRIOR EXAM ARE NOT WELL APPRECIATED ON THIS CONTRAST STUDY. 3. THERE IS A ENHANCING SOFT TISSUE MASS IN THE OPERATIVE BED ON THE LEFT. LIKELY POSTSURGICAL.  RECOMMEND REPEAT CT SCAN IN 3 MONTHS TO ENSURE STABILITY 4. A FEW NONDILATED LOOPS OF FLUID-FILLED SMALL BOWEL NOTED. THIS A NONSPECIFIC FINDING CAN SOMETIMES BE ASSOCIATED WITH ENTERITIS. CORRELATE CLINICALLY            All CT scans at this facility use dose modulation, iterative reconstruction, and/or weight based dosing when appropriate to reduce radiation dose to as low as reasonably achievable. ED BEDSIDE ULTRASOUND:   Performed by ED Physician - none    LABS:  Labs Reviewed   COMPREHENSIVE METABOLIC PANEL - Abnormal; Notable for the following components:       Result Value    Calcium 10.0 (*)     Total Protein 8.2 (*)     Alkaline Phosphatase 133 (*)     Globulin 4.2 (*)     All other components within normal limits   CBC WITH AUTO DIFFERENTIAL - Abnormal; Notable for the following components:    RBC 3.78 (*)     Hemoglobin 11.9 (*)     Hematocrit 35.7 (*)     MCH 31.4 (*)     RDW 16.5 (*)     Promyelocytes Percent 3 (*)     All other components within normal limits   URINE RT REFLEX TO CULTURE - Abnormal; Notable for the following components:    Color, UA DARK YELLOW (*)     Nitrite, Urine POSITIVE (*)     All other components within normal limits   POCT CREATININE - URINE - Normal   CULTURE, BLOOD 1    Narrative:     ORDER#: [id number]                          ORDERED BY: BEVERLY DUNAWAY  SOURCE: Blood                              COLLECTED:  01/19/21 20:06  ANTIBIOTICS AT [facility].:                      RECEIVED :  01/19/21 20:06   CULTURE, BLOOD 2    Narrative:     ORDER#: [id number]                          ORDERED BY: BEVERLY DUNAWAY  SOURCE: Blood                              COLLECTED:  01/19/21 19:34  ANTIBIOTICS AT [facility].:                      RECEIVED :  01/19/21 19:34   LACTIC ACID, PLASMA   LIPASE   MAGNESIUM   PROCALCITONIN   MICROSCOPIC URINALYSIS   POCT VENOUS       All other labs were within normal range or not returned as of this dictation.     EMERGENCY DEPARTMENT COURSE and DIFFERENTIAL DIAGNOSIS/MDM:   Vitals:    Vitals: 01/19/21 1825 01/19/21 2030 01/19/21 2100 01/19/21 2130   BP: (!) 154/84 127/75 120/72 126/72   Pulse: 99 79  76   Resp: 18   20   Temp: 98.4 °F (36.9 °C)      TempSrc: Temporal      SpO2: 97% 97% 97% 97%   Weight: 109 lb (49.4 kg)      Height: 5' 1\" (1.549 m)            MDM     Pt is a 45 yo F who presents to the ED with abd pain, back pain. She is afebrile and hemodynamically stable. She was given 1 L IV NS in the ED. Pt denied need for pain medication in the ED. CBC remarkable for anemia with H&H 11.9 and 35.7 respectively. WBC wnl at 8.1, improved from 12.9 on 1/13. UA + for nitrites only. No leukocyste esterase, WBC, or bacteria. Reviewed urine culture from 1/13. Grew out E/Coli and sensitive to macrobid which the patient is taking. Remainder of labs unremarkable including pro calcitonin and lactic acid. Blood cultures pending. CT abd pelvis shows areas of low attenuation within the mid to lower pole of the R kidney with mild perinephric stranding. No other acute abnormality. Pt reassessed and is feeling better. She is tolerating po intake while in the ED. She is non toxic appearing. Less concern for sepsis at this time as pt is afebrile, no leukocytosis, lactic and procalcitonin are wnl and pt not meeting any SIRS criteria. Suspect CT findings represent resolving inflammation 2/2 to dx of pyelonephritis on 1/13 as UTI appears resolved at this time. Pt is stable for discharge. She was referred to Dr. Trina Marte for follow up in 1 day. Pt states she has been taking naproxen for her pain at home. She is afraid to take the tramadol that was prescribed to her. I advised the patient to avoid naproxen with history of L nephrectomy. Pt to return to the ED immediately for worsening sx, given warning signs for which she should return. Pt understands and agrees to plan, all questions answered. This case was discussed with ED attending Dr. Stephanie Flores who agrees with plan and disposition.              REASSESSMENT CRITICAL CARE TIME   Total Critical Care time was 0 minutes, excluding separately reportable procedures. There was a high probability of clinically significant/life threatening deterioration in the patient's condition which required my urgent intervention. CONSULTS:  None    PROCEDURES:  Unless otherwise noted below, none     Procedures        FINAL IMPRESSION      1. Abdominal pain, unspecified abdominal location    2. Nausea          DISPOSITION/PLAN   DISPOSITION Decision To Discharge 01/19/2021 09:19:44 PM      PATIENT REFERRED TO:  Alfred Owens MD  [address]  [phone]    Schedule an appointment as soon as possible for a visit   As needed, If symptoms worsen    [facility]) ED  [address] [facility]  [phone]  Go to   As needed, If symptoms worsen    Diana Fernandez MD  [address]  [facility] [phone]    Schedule an appointment as soon as possible for a visit in 1 day        DISCHARGE MEDICATIONS:  Discharge Medication List as of 1/19/2021  9:56 PM      START taking these medications    Details   acetaminophen (TYLENOL) 500 MG tablet Take 2 tablets by mouth every 6 hours as needed for Pain, Disp-80 tablet, R-0Print           Controlled Substances Monitoring:     No flowsheet data found.     (Please note that portions of this note were completed with a voice recognition program.  Efforts were made to edit the dictations but occasionally words are mis-transcribed.)    Abel Erazo PA-C (electronically signed)           Abel Erazo PA-C  01/21/21 7555

## 2021-01-20 NOTE — ED NOTES
Pt awake and alert. No s/s of distress noted at this time. Pt denies needs at this time. Will continue to monitor.         Ammy Benites RN  01/19/21 1954

## 2021-01-20 NOTE — ED NOTES
Discharge education reviewed verbally and in writing. Patient instructed to follow up with PCP and come back to the ED with any new or worsening symptoms. No questions or concerns at this time. No s/s of distress noted at this time.         Clayton Gaines RN  01/19/21 7883

## 2021-01-20 NOTE — ED NOTES
Pt awake and alert. No s/s of distress noted at this time. Pt denies needs at this time. Will continue to monitor.         Jackie Prieto RN  01/19/21 4570

## 2021-01-21 LAB
GFR AFRICAN AMERICAN: >60
GFR NON-AFRICAN AMERICAN: >60
PERFORMED ON: NORMAL
POC CREATININE: 0.9 MG/DL (ref 0.6–1.1)
POC SAMPLE TYPE: NORMAL

## 2021-01-21 ASSESSMENT — ENCOUNTER SYMPTOMS
COUGH: 0
PHOTOPHOBIA: 0
ABDOMINAL PAIN: 1
WHEEZING: 0
SHORTNESS OF BREATH: 0
CONSTIPATION: 0
ABDOMINAL DISTENTION: 0
DIARRHEA: 0
BLOOD IN STOOL: 0
BACK PAIN: 1
NAUSEA: 0
VOMITING: 0

## 2021-01-24 LAB
BLOOD CULTURE, ROUTINE: NORMAL
CULTURE, BLOOD 2: NORMAL

## 2021-11-01 ENCOUNTER — OFFICE VISIT (OUTPATIENT)
Dept: CARDIOLOGY CLINIC | Age: 51
End: 2021-11-01
Payer: MEDICAID

## 2021-11-01 VITALS
WEIGHT: 110 LBS | HEART RATE: 87 BPM | HEIGHT: 61 IN | BODY MASS INDEX: 20.77 KG/M2 | DIASTOLIC BLOOD PRESSURE: 80 MMHG | SYSTOLIC BLOOD PRESSURE: 164 MMHG | OXYGEN SATURATION: 100 %

## 2021-11-01 DIAGNOSIS — I15.1 HYPERTENSION SECONDARY TO OTHER RENAL DISORDERS: Primary | ICD-10-CM

## 2021-11-01 DIAGNOSIS — N28.89 HYPERTENSION SECONDARY TO OTHER RENAL DISORDERS: Primary | ICD-10-CM

## 2021-11-01 DIAGNOSIS — R07.9 CHEST PAIN, UNSPECIFIED TYPE: Primary | ICD-10-CM

## 2021-11-01 PROBLEM — E78.5 DYSLIPIDEMIA: Status: ACTIVE | Noted: 2018-08-14

## 2021-11-01 PROBLEM — G93.9 DISORDER OF BRAIN: Status: ACTIVE | Noted: 2021-11-01

## 2021-11-01 PROBLEM — F32.4 MAJOR DEPRESSIVE DISORDER WITH SINGLE EPISODE, IN PARTIAL REMISSION (HCC): Status: ACTIVE | Noted: 2020-11-11

## 2021-11-01 PROBLEM — F41.1 GAD (GENERALIZED ANXIETY DISORDER): Status: ACTIVE | Noted: 2018-07-24

## 2021-11-01 PROBLEM — F32.A DEPRESSIVE DISORDER: Status: ACTIVE | Noted: 2018-07-24

## 2021-11-01 PROBLEM — R00.0 TACHYCARDIA: Status: ACTIVE | Noted: 2018-07-24

## 2021-11-01 PROCEDURE — G8420 CALC BMI NORM PARAMETERS: HCPCS | Performed by: INTERNAL MEDICINE

## 2021-11-01 PROCEDURE — G8484 FLU IMMUNIZE NO ADMIN: HCPCS | Performed by: INTERNAL MEDICINE

## 2021-11-01 PROCEDURE — G8427 DOCREV CUR MEDS BY ELIG CLIN: HCPCS | Performed by: INTERNAL MEDICINE

## 2021-11-01 PROCEDURE — 93000 ELECTROCARDIOGRAM COMPLETE: CPT | Performed by: INTERNAL MEDICINE

## 2021-11-01 PROCEDURE — 99244 OFF/OP CNSLTJ NEW/EST MOD 40: CPT | Performed by: INTERNAL MEDICINE

## 2021-11-01 RX ORDER — MAGNESIUM 200 MG
200 TABLET ORAL DAILY
COMMUNITY

## 2021-11-01 RX ORDER — ERGOCALCIFEROL (VITAMIN D2) 1250 MCG
50000 CAPSULE ORAL WEEKLY
COMMUNITY
Start: 2020-12-16

## 2021-11-01 RX ORDER — LISINOPRIL 10 MG/1
10 TABLET ORAL DAILY
COMMUNITY
Start: 2021-03-08

## 2021-11-01 RX ORDER — IRON, FOLIC ACID, VITAMIN/MINERAL SUPPLEMENT 65-1MG(24)
1 KIT ORAL DAILY
COMMUNITY

## 2021-11-01 RX ORDER — ATORVASTATIN CALCIUM 20 MG/1
TABLET, FILM COATED ORAL
COMMUNITY
Start: 2021-10-21

## 2021-11-01 ASSESSMENT — ENCOUNTER SYMPTOMS
FACIAL SWELLING: 0
APNEA: 0
WHEEZING: 0
VOICE CHANGE: 0
VOMITING: 0
ALLERGIC/IMMUNOLOGIC NEGATIVE: 1
NAUSEA: 0
BLOOD IN STOOL: 0
ABDOMINAL DISTENTION: 0
TROUBLE SWALLOWING: 0
CHEST TIGHTNESS: 0
SHORTNESS OF BREATH: 1
ANAL BLEEDING: 0
COLOR CHANGE: 0
EYES NEGATIVE: 1
GASTROINTESTINAL NEGATIVE: 1

## 2021-11-01 NOTE — PROGRESS NOTES
[facility] CARDIOLOGY OFFICE FOLLOW-UP      Patient: Graciela Moore  YOB: 1970  MRN: [id number]    Chief Complaint:  Chief Complaint   Patient presents with    New Patient         Subjective/HPI:  11/01/21: Patient presents today for cardiac evaluation. She is a patient of Dr. Gail Ngo. Labs done at [facility] showed elevated BNP and she was told he may have congestive heart failure. 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 She is a smoker. Adopted. She has been to several countries in the world. Was stationed in [place] for few months. proBNP was 360. Few days ago she went to see the gastroenterologist.  They took her to [place]. And the transport did not arrive to take her back to [address] [place] [address]. She walked 19 miles into her 9 hours to get back home. She left [facility] [place] [facility] at 7 PM and got here leg 5 PM to home. Long world lady. No definite chest pain but gets short of breath. Again walking 19 miles is an achievement by itself. She needs to be evaluated for coronary artery disease       Past Medical History:   Diagnosis Date    Hyperlipidemia     Hypertension        Past Surgical History:   Procedure Laterality Date    HYSTERECTOMY         No family history on file.     Social History     Socioeconomic History    Marital status: Single     Spouse name: Not on file    Number of children: Not on file    Years of education: Not on file    Highest education level: Not on file   Occupational History    Not on file   Tobacco Use    Smoking status: Heavy Tobacco Smoker     Packs/day: 0.50     Years: 25.00     Pack years: 12.50     Types: Cigarettes    Smokeless tobacco: Never Used   Vaping Use    Vaping Use: Never used   Substance and Sexual Activity    Alcohol use: Yes     Comment: 3 glasses wine qd    Drug use: Never    Sexual activity: Not on file   Other Topics Concern    Not on file   Social History Narrative    Not on file     Social Determinants of Health     Financial Resource Strain:     Difficulty of Paying Living Expenses:    Food Insecurity:     Worried About Running Out of Food in the Last Year:     [address] in the Last Year:    Transportation Needs:     Lack of Transportation (Medical):  Lack of Transportation (Non-Medical):    Physical Activity:     Days of Exercise per Week:     Minutes of Exercise per Session:    Stress:     Feeling of Stress :    Social Connections:     Frequency of Communication with Friends and Family:     Frequency of Social Gatherings with Friends and Family:     Attends Islam Services:     Active Member of Clubs or Organizations:     Attends Club or Organization Meetings:     Marital Status:    Intimate Partner Violence:     Fear of Current or Ex-Partner:     Emotionally Abused:     Physically Abused:     Sexually Abused: Allergies   Allergen Reactions    Morphine And Related      TOXIC SHOCK    Penicillins      Hives SOB       Current Outpatient Medications   Medication Sig Dispense Refill    acetaminophen (TYLENOL) 500 MG tablet Take 2 tablets by mouth every 6 hours as needed for Pain 80 tablet 0    cephALEXin (KEFLEX) 500 MG capsule Take 1 capsule by mouth 4 times daily 40 capsule 0    ondansetron (ZOFRAN ODT) 4 MG disintegrating tablet Take 1 tablet by mouth every 8 hours as needed for Nausea 20 tablet 0    naproxen (NAPROSYN) 500 MG tablet Take 1 tablet by mouth 2 times daily 60 tablet 0     No current facility-administered medications for this visit. Review of Systems:   Review of Systems   Constitutional: Negative for activity change, appetite change, diaphoresis, fatigue and unexpected weight change. HENT: Negative. Negative for facial swelling, nosebleeds, trouble swallowing and voice change. Eyes: Negative. Respiratory: Positive for shortness of breath. Negative for apnea, chest tightness and wheezing.     Cardiovascular: Positive for chest pain. Negative for palpitations and leg swelling. Gastrointestinal: Negative. Negative for abdominal distention, anal bleeding, blood in stool, nausea and vomiting. Endocrine: Negative. Genitourinary: Negative. Negative for decreased urine volume and dysuria. Musculoskeletal: Negative for gait problem and myalgias. Skin: Negative. Negative for color change, pallor, rash and wound. Allergic/Immunologic: Negative. Neurological: Positive for dizziness. Negative for syncope, facial asymmetry, weakness, light-headedness, numbness and headaches. Hematological: Does not bruise/bleed easily. Psychiatric/Behavioral: Negative. Negative for agitation, behavioral problems, confusion, hallucinations and suicidal ideas. The patient is not nervous/anxious. All other systems reviewed and are negative. Review of System is negative except for as mentioned above. Physical Examination:    Ht 5' 1\" (1.549 m)   Wt 110 lb (49.9 kg)   BMI 20.78 kg/m²    Physical Exam   Constitutional: She appears healthy. No distress. HENT:   Nose: Nose normal.   Mouth/Throat: Dentition is normal. Oropharynx is clear. Eyes: Pupils are equal, round, and reactive to light. Conjunctivae are normal.   Neck: Thyroid normal.   Cardiovascular: Regular rhythm, S1 normal, S2 normal, normal heart sounds, intact distal pulses and normal pulses. PMI is not displaced. No murmur heard. Pulmonary/Chest: She has no wheezes. She has no rales. She exhibits no tenderness. Abdominal: Soft. Bowel sounds are normal. She exhibits no distension and no mass. There is no splenomegaly or hepatomegaly. There is no abdominal tenderness. No hernia. Musculoskeletal:      Cervical back: Normal range of motion and neck supple. Neurological: She is alert and oriented to person, place, and time. She has normal motor skills. Gait normal.   Skin: Skin is warm and dry. No cyanosis. No jaundice. Nails show no clubbing. Assessment/Orders:    Tobacco abuse  Hypertension  Dyslipidemia  Elevated BNP            Plan:  Lexiscan  Echo  Once these tests are negative, refer to [facility] gastroenterology service per her choice        See me in 1 month        Electronically signed by: Ayana Archer MD  11/1/2021 12:26 PM

## 2023-07-02 ENCOUNTER — HOSPITAL ENCOUNTER (EMERGENCY)
Age: 53
Discharge: HOME | End: 2023-07-02
Attending: EMERGENCY MEDICINE
Payer: MEDICAID

## 2023-07-02 ENCOUNTER — APPOINTMENT (OUTPATIENT)
Dept: CT IMAGING | Age: 53
End: 2023-07-02
Payer: MEDICAID

## 2023-07-02 VITALS
HEIGHT: 60 IN | SYSTOLIC BLOOD PRESSURE: 129 MMHG | BODY MASS INDEX: 20.81 KG/M2 | WEIGHT: 106 LBS | TEMPERATURE: 98.4 F | RESPIRATION RATE: 18 BRPM | OXYGEN SATURATION: 96 % | DIASTOLIC BLOOD PRESSURE: 72 MMHG | HEART RATE: 94 BPM

## 2023-07-02 DIAGNOSIS — S09.90XA CLOSED HEAD INJURY, INITIAL ENCOUNTER: Primary | ICD-10-CM

## 2023-07-02 PROCEDURE — 70450 CT HEAD/BRAIN W/O DYE: CPT

## 2023-07-02 PROCEDURE — 99284 EMERGENCY DEPT VISIT MOD MDM: CPT

## 2023-07-02 ASSESSMENT — LIFESTYLE VARIABLES
HOW MANY STANDARD DRINKS CONTAINING ALCOHOL DO YOU HAVE ON A TYPICAL DAY: 5 OR 6
HOW OFTEN DO YOU HAVE A DRINK CONTAINING ALCOHOL: 4 OR MORE TIMES A WEEK

## 2023-07-02 ASSESSMENT — PAIN DESCRIPTION - PAIN TYPE: TYPE: ACUTE PAIN

## 2023-07-02 ASSESSMENT — PAIN SCALES - GENERAL: PAINLEVEL_OUTOF10: 4

## 2023-07-02 ASSESSMENT — PAIN - FUNCTIONAL ASSESSMENT: PAIN_FUNCTIONAL_ASSESSMENT: NONE - DENIES PAIN

## 2023-07-02 ASSESSMENT — PAIN DESCRIPTION - LOCATION: LOCATION: HEAD

## 2023-07-03 NOTE — ED PROVIDER NOTES
The patient approached the nursing station requesting to be discharged while her CT was pending several times. She was given 2 trays of food. She was updated on her unremarkable CT and is appropriate for discharge at this time.      Dawit Torres MD  07/02/23 7733

## 2023-09-14 ENCOUNTER — HOSPITAL ENCOUNTER (EMERGENCY)
Age: 53
Discharge: HOME | End: 2023-09-14
Attending: STUDENT IN AN ORGANIZED HEALTH CARE EDUCATION/TRAINING PROGRAM
Payer: MEDICAID

## 2023-09-14 VITALS
BODY MASS INDEX: 19.45 KG/M2 | HEART RATE: 94 BPM | OXYGEN SATURATION: 98 % | RESPIRATION RATE: 16 BRPM | DIASTOLIC BLOOD PRESSURE: 97 MMHG | WEIGHT: 103 LBS | HEIGHT: 61 IN | TEMPERATURE: 98.1 F | SYSTOLIC BLOOD PRESSURE: 158 MMHG
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
## 2023-09-14 DIAGNOSIS — Z20.3 NEED FOR POST EXPOSURE PROPHYLAXIS FOR RABIES: ICD-10-CM

## 2023-09-14 PROCEDURE — 6360000002 HC RX W HCPCS: Performed by: STUDENT IN AN ORGANIZED HEALTH CARE EDUCATION/TRAINING PROGRAM

## 2023-09-14 PROCEDURE — 90472 IMMUNIZATION ADMIN EACH ADD: CPT | Performed by: STUDENT IN AN ORGANIZED HEALTH CARE EDUCATION/TRAINING PROGRAM

## 2023-09-14 PROCEDURE — 90715 TDAP VACCINE 7 YRS/> IM: CPT | Performed by: STUDENT IN AN ORGANIZED HEALTH CARE EDUCATION/TRAINING PROGRAM

## 2023-09-14 PROCEDURE — 90375 RABIES IG IM/SC: CPT | Performed by: STUDENT IN AN ORGANIZED HEALTH CARE EDUCATION/TRAINING PROGRAM

## 2023-09-14 PROCEDURE — 90675 RABIES VACCINE IM: CPT | Performed by: STUDENT IN AN ORGANIZED HEALTH CARE EDUCATION/TRAINING PROGRAM

## 2023-09-14 PROCEDURE — 99284 EMERGENCY DEPT VISIT MOD MDM: CPT

## 2023-09-14 PROCEDURE — 96372 THER/PROPH/DIAG INJ SC/IM: CPT

## 2023-09-14 PROCEDURE — 90471 IMMUNIZATION ADMIN: CPT | Performed by: STUDENT IN AN ORGANIZED HEALTH CARE EDUCATION/TRAINING PROGRAM

## 2023-09-14 RX ORDER — CLINDAMYCIN HYDROCHLORIDE 300 MG/1
300 CAPSULE ORAL 4 TIMES DAILY
Qty: 20 CAPSULE | Refills: 0 | Status: SHIPPED | OUTPATIENT
Start: 2023-09-14 | End: 2023-09-19

## 2023-09-14 RX ORDER — MAGNESIUM HYDROXIDE 1200 MG/15ML
1000 LIQUID ORAL ONCE
Status: DISCONTINUED | OUTPATIENT
Start: 2023-09-14 | End: 2023-09-14 | Stop reason: HOSPADM

## 2023-09-14 RX ADMIN — RABIES IMMUNE GLOBULIN (HUMAN) 930 UNITS: 300 INJECTION, SOLUTION INFILTRATION; INTRAMUSCULAR at 21:09

## 2023-09-14 RX ADMIN — Medication 1 ML: at 21:07

## 2023-09-14 RX ADMIN — TETANUS TOXOID, REDUCED DIPHTHERIA TOXOID AND ACELLULAR PERTUSSIS VACCINE, ADSORBED 0.5 ML: 5; 2.5; 8; 8; 2.5 SUSPENSION INTRAMUSCULAR at 21:09

## 2023-09-14 ASSESSMENT — ENCOUNTER SYMPTOMS
TROUBLE SWALLOWING: 0
COLOR CHANGE: 1

## 2023-09-14 ASSESSMENT — PAIN - FUNCTIONAL ASSESSMENT: PAIN_FUNCTIONAL_ASSESSMENT: 0-10

## 2023-09-14 ASSESSMENT — PAIN SCALES - GENERAL: PAINLEVEL_OUTOF10: 4

## 2023-09-15 NOTE — ED PROVIDER NOTES
[facility] ED  EMERGENCY DEPARTMENT ENCOUNTER      Pt Name: Joseluis Ohara  MRN: [id number]  [address] [facility] [place] 1970  Date of evaluation: 9/14/2023  Provider: Deneen Clement MD    CHIEF COMPLAINT       Chief Complaint   Patient presents with    Animal Bite     Bit by a raccoon in her home. Puncture wounds to right hand. Pt put peroxide on wounds PTA         HISTORY OF PRESENT ILLNESS   (Location/Symptom, Timing/Onset, Context/Setting, Quality, Duration, Modifying Factors, Severity)  Note limiting factors. Joseluis Ohara is a 52 y.o. female who presents to the emergency department for raccoon bite. HPI  52-year-old female with past medical history notable for HTN, HLD, and depression who presents to the emergency department in the care of EMS for evaluation after sudden onset raccoon bite to the right wrist which occurred approximately 3 hours prior to arrival.  Reports that she was trying to shoo the raccoons away from her cat and dog food when one of them bit her. She reports recent scratch and bite to the left forearm about 1 week ago after attempting to get them away from the food previously. She reports that she cleaned the wound with hydrogen peroxide prior to calling for EMS. Reports that her tetanus status is out of date, and that is why she came to the emergency room. No associated numbness or weakness. Tetanus last updated 13 years ago  No prior rabies vaccine or IG    Nursing Notes were reviewed. REVIEW OF SYSTEMS    (2-9 systems for level 4, 10 or more for level 5)     Review of Systems   Constitutional:  Negative for chills and fever. HENT:  Negative for drooling and trouble swallowing. Musculoskeletal:  Positive for arthralgias. Negative for joint swelling. Skin:  Positive for color change and wound. Psychiatric/Behavioral:  Negative for confusion. Except as noted above the remainder of the review of systems was reviewed and negative.        PAST MEDICAL HISTORY

## 2023-09-15 NOTE — DISCHARGE INSTRUCTIONS
You were bit by raccoon which are known to carry rabies. Your tetanus status was updated here. You received rabies immunoglobulin as well as rabies vaccine You will need to return for additional vaccination on days 3, 7, and 14. One dose (1 mL) IM on days 3, 7, and 14. Please return to the ER on 9/17, 9/21, 9/28 for repeat dosing.

## 2023-09-15 NOTE — ED NOTES
Pt states she was bit in her hand by a raccoon that was in her house. Pt states she has 4 raccoons that come in her house. States she was bitten last week but didn't come in to be seen. States she cleaned her wounds with peroxide. Pt states she just came here for a tetanus shot.       Manohar Cruz RN  09/14/23 2038

## 2023-09-17 ENCOUNTER — HOSPITAL ENCOUNTER (EMERGENCY)
Age: 53
Discharge: HOME | End: 2023-09-17
Payer: MEDICAID

## 2023-09-17 VITALS
RESPIRATION RATE: 18 BRPM | HEART RATE: 118 BPM | OXYGEN SATURATION: 100 % | TEMPERATURE: 98.1 F | DIASTOLIC BLOOD PRESSURE: 88 MMHG | SYSTOLIC BLOOD PRESSURE: 155 MMHG

## 2023-09-17 DIAGNOSIS — I10 ESSENTIAL HYPERTENSION: ICD-10-CM

## 2023-09-17 DIAGNOSIS — Z20.3 NEED FOR POST EXPOSURE PROPHYLAXIS FOR RABIES: Primary | ICD-10-CM

## 2023-09-17 PROCEDURE — 6360000002 HC RX W HCPCS

## 2023-09-17 PROCEDURE — 90471 IMMUNIZATION ADMIN: CPT

## 2023-09-17 PROCEDURE — 99284 EMERGENCY DEPT VISIT MOD MDM: CPT

## 2023-09-17 PROCEDURE — 90675 RABIES VACCINE IM: CPT

## 2023-09-17 RX ADMIN — RABIES VACCINE 1 ML: KIT at 21:03

## 2023-09-17 ASSESSMENT — ENCOUNTER SYMPTOMS
DIARRHEA: 0
VOMITING: 0
SHORTNESS OF BREATH: 0
NAUSEA: 0
ABDOMINAL PAIN: 0
PHOTOPHOBIA: 0
COUGH: 0

## 2023-09-17 ASSESSMENT — PAIN - FUNCTIONAL ASSESSMENT: PAIN_FUNCTIONAL_ASSESSMENT: NONE - DENIES PAIN

## 2023-09-18 NOTE — ED NOTES
Went to assess patient. Pt short with this RN when attempting to assess this patient. Informed patient this RN would be back when orders were placed and to press the call light if she needs anything.       Ramon Jha RN  09/17/23 2030

## 2023-09-18 NOTE — ED PROVIDER NOTES
[facility] ED  EMERGENCY DEPARTMENT ENCOUNTER      Pt Name: Roopa Hernandez  MRN: [id number]  [address] [facility] 1970  Date of evaluation: 9/17/2023  Provider: CANDIE Justice    CHIEF COMPLAINT       Chief Complaint   Patient presents with    Rabies vaccine         HISTORY OF PRESENT ILLNESS   (Location/Symptom, Timing/Onset, Context/Setting, Quality, Duration, Modifying Factors, Severity)  Note limiting factors. Roopa Hernandez is a 52 y.o. female who presents to the emergency department PMHx hyperlipidemia, DHIRAJ, hypertension, MDD, lumbar spondylosis. Patient presents to the emergency department for evaluation of need for postexposure prophylaxis to rabies. Patient was evaluated 3 days ago in this ED following a raccoon bite, at that time she had wound care performed, she had rabies vaccine and immunoglobulin given as well as updated tetanus, patient without any history of prior rabies vaccinations. Patient states that her puncture wound has been healing well. No redness, swelling, induration, severe pain, bleeding, discharge. No fever, chills, n/v.         Nursing Notes were reviewed. REVIEW OF SYSTEMS    (2-9 systems for level 4, 10 or more for level 5)     Review of Systems   Constitutional:  Negative for chills and fever. HENT:  Negative for congestion. Eyes:  Negative for photophobia. Respiratory:  Negative for cough and shortness of breath. Cardiovascular:  Negative for chest pain. Gastrointestinal:  Negative for abdominal pain, diarrhea, nausea and vomiting. Genitourinary:  Negative for difficulty urinating. Musculoskeletal:  Negative for myalgias. Skin:  Positive for wound (healed). Neurological:  Negative for headaches. Psychiatric/Behavioral:  Negative for confusion. Except as noted above the remainder of the review of systems was reviewed and negative.        PAST MEDICAL HISTORY     Past Medical History:   Diagnosis Date    Depressive disorder

## 2023-09-24 ENCOUNTER — HOSPITAL ENCOUNTER (EMERGENCY)
Age: 53
Discharge: HOME | End: 2023-09-24
Payer: MEDICAID

## 2023-09-24 VITALS
SYSTOLIC BLOOD PRESSURE: 141 MMHG | DIASTOLIC BLOOD PRESSURE: 86 MMHG | OXYGEN SATURATION: 98 % | RESPIRATION RATE: 16 BRPM | BODY MASS INDEX: 19.07 KG/M2 | TEMPERATURE: 97.5 F | WEIGHT: 101 LBS | HEART RATE: 60 BPM | HEIGHT: 61 IN

## 2023-09-24 DIAGNOSIS — Z23 NEED FOR PROPHYLACTIC VACCINATION AND INOCULATION AGAINST RABIES: Primary | ICD-10-CM

## 2023-09-24 PROCEDURE — 99284 EMERGENCY DEPT VISIT MOD MDM: CPT

## 2023-09-24 PROCEDURE — 90471 IMMUNIZATION ADMIN: CPT

## 2023-09-24 PROCEDURE — 90675 RABIES VACCINE IM: CPT

## 2023-09-24 PROCEDURE — 6360000002 HC RX W HCPCS

## 2023-09-24 RX ADMIN — Medication 1 ML: at 13:55

## 2023-09-24 ASSESSMENT — LIFESTYLE VARIABLES
HOW OFTEN DO YOU HAVE A DRINK CONTAINING ALCOHOL: 4 OR MORE TIMES A WEEK
HOW MANY STANDARD DRINKS CONTAINING ALCOHOL DO YOU HAVE ON A TYPICAL DAY: 3 OR 4

## 2023-09-24 ASSESSMENT — ENCOUNTER SYMPTOMS
TROUBLE SWALLOWING: 0
BACK PAIN: 0
NAUSEA: 0
VOMITING: 0
COLOR CHANGE: 0
DIARRHEA: 0
VOICE CHANGE: 0
SHORTNESS OF BREATH: 0

## 2023-09-24 ASSESSMENT — PAIN - FUNCTIONAL ASSESSMENT: PAIN_FUNCTIONAL_ASSESSMENT: NONE - DENIES PAIN

## 2023-09-24 NOTE — ED PROVIDER NOTES
[facility] ED  eMERGENCYdEPARTMENT eNCOUnter      Pt Name: Angeles Freire  MRN: [id number]  [address] [facility] [place] 1970of evaluation: 9/24/2023  Provider:PRATIBHA Hough - ELIZABETH    CHIEF COMPLAINT       Chief Complaint   Patient presents with    2nd rabbies vaccine         HISTORY OF PRESENT ILLNESS  (Location/Symptom, Timing/Onset, Context/Setting, Quality, Duration, Modifying Factors, Severity.)   Angeles Freire is a 52 y.o. female who presents to the emergency department evaluation of need for her 3rd postexposure prophylaxis to rabies. Patient was evaluated 10 days ago in this ED following a raccoon bite, at that time she had wound care performed, received antibiotics and had rabies vaccine and immunoglobulin given as well as updated tetanus. She had an additional vaccine on 09/17/23. her puncture wound to right medial dorsal distal forearm has been healing well. Denies redness, swelling, induration, severe pain, bleeding, discharge. Denies any fever, chills, n/v.      HPI    Nursing Notes were reviewed and I agree. REVIEW OF SYSTEMS    (2-9 systems for level 4, 10 or more for level 5)     Review of Systems   Constitutional:  Negative for activity change, chills and fatigue. HENT:  Negative for trouble swallowing and voice change. Respiratory:  Negative for shortness of breath. Cardiovascular:  Negative for chest pain. Gastrointestinal:  Negative for diarrhea, nausea and vomiting. Genitourinary:  Negative for difficulty urinating. Musculoskeletal:  Negative for back pain. Skin:  Positive for wound (healing). Negative for color change, pallor and rash. Neurological:  Negative for dizziness, light-headedness and headaches. Hematological:  Does not bruise/bleed easily. Psychiatric/Behavioral:  Negative for sleep disturbance. as noted above the remainder of the review of systems was reviewed and negative.        PAST MEDICAL HISTORY     Past Medical History:   Diagnosis

## 2023-09-24 NOTE — ED TRIAGE NOTES
Patient arrived to ER by foot. Patient states is here for her 2nd rabbies vaccine. Denies any complaints.

## 2023-10-01 ENCOUNTER — HOSPITAL ENCOUNTER (EMERGENCY)
Age: 53
Discharge: HOME | End: 2023-10-01
Payer: MEDICAID

## 2023-10-01 VITALS
HEART RATE: 89 BPM | BODY MASS INDEX: 19.07 KG/M2 | RESPIRATION RATE: 18 BRPM | OXYGEN SATURATION: 98 % | DIASTOLIC BLOOD PRESSURE: 76 MMHG | HEIGHT: 61 IN | WEIGHT: 101 LBS | TEMPERATURE: 98.4 F | SYSTOLIC BLOOD PRESSURE: 134 MMHG

## 2023-10-01 DIAGNOSIS — Z23 ENCOUNTER FOR REPEAT ADMINISTRATION OF RABIES VACCINATION: Primary | ICD-10-CM

## 2023-10-01 PROCEDURE — 6360000002 HC RX W HCPCS: Performed by: STUDENT IN AN ORGANIZED HEALTH CARE EDUCATION/TRAINING PROGRAM

## 2023-10-01 PROCEDURE — 99284 EMERGENCY DEPT VISIT MOD MDM: CPT

## 2023-10-01 PROCEDURE — 90471 IMMUNIZATION ADMIN: CPT | Performed by: STUDENT IN AN ORGANIZED HEALTH CARE EDUCATION/TRAINING PROGRAM

## 2023-10-01 PROCEDURE — 90675 RABIES VACCINE IM: CPT | Performed by: STUDENT IN AN ORGANIZED HEALTH CARE EDUCATION/TRAINING PROGRAM

## 2023-10-01 RX ADMIN — RABIES VACCINE 1 ML: KIT at 12:58

## 2023-10-01 ASSESSMENT — ENCOUNTER SYMPTOMS
SHORTNESS OF BREATH: 0
WHEEZING: 0
COUGH: 0
ABDOMINAL PAIN: 0
VOMITING: 0
NAUSEA: 0
PHOTOPHOBIA: 0

## 2023-10-01 ASSESSMENT — PAIN - FUNCTIONAL ASSESSMENT
PAIN_FUNCTIONAL_ASSESSMENT: NONE - DENIES PAIN
PAIN_FUNCTIONAL_ASSESSMENT: NONE - DENIES PAIN

## 2023-10-01 NOTE — ED NOTES
D/C instructions given by CANDIE Potts. Verbalized understanding. Denies any further complaints. No acute distress noted. Amb out with steady gait.        Kenneth Shelton RN  10/01/23 0540

## 2023-10-01 NOTE — ED PROVIDER NOTES
[facility] ED  EMERGENCY DEPARTMENT ENCOUNTER      Pt Name: Ralf Camarena  MRN: [id number]  [address] [facility] 1970  Date of evaluation: 10/1/2023  Provider: Hannah Porras PA-C    CHIEF COMPLAINT       Chief Complaint   Patient presents with    rabies follow up     Needs 3rd shot         HISTORY OF PRESENT ILLNESS   (Location/Symptom, Timing/Onset, Context/Setting, Quality, Duration, Modifying Factors, Severity)  Note limiting factors. Ralf Camarena is a 52 y.o. female who presents to the emergency department for rabies vaccination. Patient states she was bit by a raccoon on 9/14/2023, R wrist.  Was seen in the ED at that time provided rabies immunoglobulin and first rabies vaccination. This is her third and final rabies vaccination. Puncture wounds healed, no pain swelling redness drainage fever or chills. No other complaints at this time. Has completed her abx. HPI    Nursing Notes were reviewed. REVIEW OF SYSTEMS    (2-9 systems for level 4, 10 or more for level 5)     Review of Systems   Constitutional:  Negative for chills and fever. HENT:  Negative for congestion. Eyes:  Negative for photophobia. Respiratory:  Negative for cough, shortness of breath and wheezing. Cardiovascular:  Negative for chest pain and palpitations. Gastrointestinal:  Negative for abdominal pain, nausea and vomiting. Genitourinary:  Negative for dysuria, frequency and hematuria. Musculoskeletal:  Negative for myalgias. Allergic/Immunologic: Negative for immunocompromised state. Neurological:  Negative for dizziness, weakness and headaches. All other systems reviewed and are negative. Except as noted above the remainder of the review of systems was reviewed and negative.        PAST MEDICAL HISTORY     Past Medical History:   Diagnosis Date    Depressive disorder 7/24/2018    Hyperlipidemia     Hypertension          SURGICAL HISTORY       Past Surgical History:   Procedure Laterality Date